# Patient Record
Sex: MALE | Race: WHITE
[De-identification: names, ages, dates, MRNs, and addresses within clinical notes are randomized per-mention and may not be internally consistent; named-entity substitution may affect disease eponyms.]

---

## 2022-09-20 ENCOUNTER — HOSPITAL ENCOUNTER (EMERGENCY)
Dept: HOSPITAL 46 - ED | Age: 72
Discharge: HOME | End: 2022-09-20
Payer: MEDICARE

## 2022-09-20 VITALS — BODY MASS INDEX: 21.48 KG/M2 | HEIGHT: 69 IN | WEIGHT: 145 LBS

## 2022-09-20 DIAGNOSIS — Z79.899: ICD-10-CM

## 2022-09-20 DIAGNOSIS — K59.00: ICD-10-CM

## 2022-09-20 DIAGNOSIS — R33.9: Primary | ICD-10-CM

## 2022-09-20 NOTE — XMS
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
 
Security Information
 
Security Events
No recent Security Events currently on file
 
 
 
CRITERIA MET
------------
- [facility] - 2 Visits in 30 Days
 
 
CARE PROVIDERS
There are no care providers on record at this time.
 
Carleen has no Care Guidelines for this patient.
 
ECRISTIN VISIT COUNT (12 MO.)
-------------------------------------------------------------------------------------
[address] [place] [address]
-------------------------------------------------------------------------------------
TOTAL 2
-------------------------------------------------------------------------------------
NOTE: Visits indicate total known visits.
 
ED/UCC VISIT TRACKING (12 MO.)
-------------------------------------------------------------------------------------
[facility]2022 01[facility]19
[facility]
 
TYPE: Emergency
 
COMPLAINT:
- URINE PROBLEM
-------------------------------------------------------------------------------------
09/18/2022 23:10
[place] F[facility]
 
TYPE: Emergency
 
COMPLAINT:
- URINARY RETENTION
-------------------------------------------------------------------------------------
 
 
INPATIENT VISIT TRACKING (12 MO.)
No inpatient visits to display in this time frame
 
[email or web address]